# Patient Record
Sex: MALE | URBAN - METROPOLITAN AREA
[De-identification: names, ages, dates, MRNs, and addresses within clinical notes are randomized per-mention and may not be internally consistent; named-entity substitution may affect disease eponyms.]

---

## 2018-03-20 NOTE — PATIENT DISCUSSION
if pt doesn't achieve good vision with contact in left eye ; than may need to consider cataract surgery.

## 2019-02-19 NOTE — PATIENT DISCUSSION
stable. use pred once a day until pt runs out then switch to [address] [place] [address] Nicholas LONDONO.

## 2019-03-07 NOTE — PATIENT DISCUSSION
stable. use pred once a day until pt runs out then switch to [address] [place] [address] Nicholas LONDONO.

## 2019-03-07 NOTE — PATIENT DISCUSSION
stable. use pred once a day until pt runs out then switch to [address] [place] [address] Nicholas LONDONO.

## 2019-03-08 NOTE — PATIENT DISCUSSION
stable. use pred once a day until pt runs out then switch to [address] [place] [address] Nicholas LONDONO.

## 2019-03-12 NOTE — PATIENT DISCUSSION
pt ed needs more time to heal to assess results OD.  Pt says is NOT what he expected and is asking about LVC.  I ed much too early as the eye is not considered stable at this point.

## 2019-03-12 NOTE — PATIENT DISCUSSION
stable. use pred once a day until pt runs out then switch to [address] [place] [address] Nicholas LONDONO.

## 2019-03-18 NOTE — PATIENT DISCUSSION
vision may improve, but may be able to do [facility] at a later time. also disc CL therapy to get best potential vision.

## 2019-03-18 NOTE — PATIENT DISCUSSION
stable. use pred once a day until pt runs out then switch to [address] [place] [address] Nicholas LONDONO.

## 2019-03-18 NOTE — PATIENT DISCUSSION
Chief Complaints and History of Present Illnesses   Patient presents with     Eye Pain Left Eye     HPI    Affected eye(s):  Left   Symptoms:        Duration:  8 months   Frequency:  Intermittent       Do you have eye pain now?:  No      Comments:  Has intermittent pain in his left eye that has not resolved.  He is wondering if he can be referred to the [facility].  Sometimes vision in the RE gets a little blurred when he has eye pain in the left eye.  Not using any eye drops  Ana Hyatt, ARCENIO 1:25 PM 08/29/2017                    Plan OS 2nd; CV/B+ Goal MISTY OU. + TOPICLA/+Diamox+ENDOCOAT.

## 2019-04-25 NOTE — PATIENT DISCUSSION
"Requested Prescriptions   Pending Prescriptions Disp Refills     sertraline (ZOLOFT) 50 MG tablet [Pharmacy Med Name: SERTRALINE 50MG TABLETS] 135 tablet 0    Last Written Prescription Date:  07/05/18  Last Fill Quantity: 135,  # refills: 0   Last office visit: 9/5/2018 with prescribing provider:  09/05/18   Future Office Visit:     Sig: TAKE 1 AND 1/2 TABLETS(75 MG) BY MOUTH DAILY    SSRIs Protocol Passed    11/14/2018  8:51 AM       Passed - Recent (12 mo) or future (30 days) visit within the authorizing provider's specialty    Patient had office visit in the last 12 months or has a visit in the next 30 days with authorizing provider or within the authorizing provider's specialty.  See \"Patient Info\" tab in inbasket, or \"Choose Columns\" in Meds & Orders section of the refill encounter.             Passed - Patient is age 18 or older       Passed - No active pregnancy on record       Passed - No positive pregnancy test in last 12 months          " stable. use pred once a day until pt runs out then switch to [address] [place] [address] Nicholas LONDONO.

## 2019-04-25 NOTE — PATIENT DISCUSSION
stable. use pred once a day until pt runs out then switch to [address] [place] [address] Nicholas LONDONO.

## 2019-04-25 NOTE — PATIENT DISCUSSION
vision may improve, but may be able to do [facility] at a later time. also disc CL therapy to get best potential vision.

## 2019-04-25 NOTE — PATIENT DISCUSSION
vision may improve, but may be able to do [facility] at a later time. also disc CL therapy to get best potential vision.

## 2019-04-26 NOTE — PATIENT DISCUSSION
vision may improve, but may be able to do [facility] at a later time. also disc CL therapy to get best potential vision.

## 2019-04-26 NOTE — PATIENT DISCUSSION
stable. use pred once a day until pt runs out then switch to [address] [place] [address] Nicholas LONDONO.

## 2019-05-17 NOTE — PATIENT DISCUSSION
vision may improve, but may be able to do [facility] at a later time. also disc CL therapy to get best potential vision.

## 2019-05-17 NOTE — PATIENT DISCUSSION
Monitor.  has been out of PGB for a week.  ed re-start with PA OS and use TID for 5 days, BID for 5 days, QD for 5 days to thwart inflammation.  once done start with FML once/day ongoing.

## 2019-05-17 NOTE — PATIENT DISCUSSION
5/17/19 Good postoperative appearance.  re-start FML OD now and use QD ongoing to prevent rejection.  Pt asking about LVC enh OD today ed I will send info to [facility] team to inquire about this but currently has BCVA OD of 20/60 with MR today so this will be up to [facility].

## 2019-05-17 NOTE — PATIENT DISCUSSION
stable. use pred once a day until pt runs out then switch to [address] [place] [address] Nicholas LONDONO.

## 2019-05-23 NOTE — PROCEDURE NOTE: SURGICAL
<p>Prior to commencing surgery patient identification, surgical procedure, site, and side were confirmed by Dr. Brennan Junior. Following topical proparacaine anesthesia, the patient was positioned at the YAG laser, a contact lens coupled to the cornea with methylcellulose and an axial posterior capsulotomy performed without complication using 2.9 Mj x 26. Excess methylcellulose was washed from the eye, one drop of Alphagan was instilled and the patient returned to the holding area having tolerated the procedure well and without complication. </p><p>MRN:[id number]</p>

## 2019-07-10 NOTE — PATIENT DISCUSSION
Healing well.  pt ed may take 3-6 months to achieve full results of LVC.  pt full ed.  pt says he expected he would get 20/40 vision and it is worse than before we started.

## 2019-08-28 NOTE — PATIENT DISCUSSION
REC EPI-LASEK enhancement w/ [address] Goal MISTY OD - patient must wait 6 weeks , REPEAT AR/MR RAMYA DOS. - advised patient he may be CXL'd DOS due to shift in MR.

## 2019-10-15 NOTE — PATIENT DISCUSSION
10/15/19 MDI not loading properly. Check ocular meds @ next visit to remove any possible duplicates.

## 2019-10-15 NOTE — PROCEDURE NOTE: CLINICAL
PROCEDURE NOTE: Punctal Plugs, Silicone #1 OS. Diagnosis: Dry Eye Syndrome. Prior to treatment, the risks/benefits/alternatives were discussed. The patient wished to proceed with procedure. Permanent silicone plugs were inserted. Size/location of plugs inserted: LLL 0.7MM. Patient tolerated procedure well. There were no complications. Post procedure instructions given. Marilee Mcneal

## 2019-10-17 NOTE — PATIENT DISCUSSION
REC EPI-LASEK enhancement w/ [facility] Goal MISTY OD - patient must wait 6 weeks , REPEAT AR/MR RAMYA DOS. - advised patient he may be CXL'd DOS due to shift in MR.

## 2019-10-17 NOTE — PATIENT DISCUSSION
Patient informed and instructed to give more time to heal.  Patient informed we do not expect instant outcomes with [address] [place] [address].

## 2019-10-18 NOTE — PATIENT DISCUSSION
REC EPI-LASEK enhancement w/ [address] Goal MISTY OD - patient must wait 6 weeks , REPEAT AR/MR RAMAY DOS. - advised patient he may be CXL'd DOS due to shift in MR.

## 2019-10-18 NOTE — PATIENT DISCUSSION
Patient informed and instructed to give more time to heal.  Patient informed we do not expect instant outcomes with Abby Kimball.

## 2019-10-24 NOTE — PATIENT DISCUSSION
REC EPI-LASEK enhancement w/ [facility] Goal MISTY OD - patient must wait 6 weeks , REPEAT AR/MR RAMYA DOS. - advised patient he may be CXL'd DOS due to shift in MR.

## 2019-10-24 NOTE — PATIENT DISCUSSION
Patient informed and instructed to give more time to heal.  Patient informed we do not expect instant outcomes with Janey Malone.

## 2019-11-26 NOTE — PATIENT DISCUSSION
Patient informed that he had multiple corneal surgeries and needs to be patient to let cornea heal more.

## 2019-11-26 NOTE — PATIENT DISCUSSION
Patient informed and instructed to give more time to heal.  Patient informed we do not expect instant outcomes with Piper Jimenez.

## 2019-12-03 NOTE — PATIENT DISCUSSION
Pt is not a candidate for Lasek in left eye. Will need contact lens to see achieve best corrected vision.

## 2020-03-17 NOTE — PATIENT DISCUSSION
Still healing and pt has irregular astigmatism and will need a contact lens. Will need to wait 6 months from last Lasek Treatment to have contacts. Will achieve best corrected vision with contacts.

## 2020-05-14 ENCOUNTER — IMPORTED ENCOUNTER (OUTPATIENT)
Dept: URBAN - METROPOLITAN AREA CLINIC 9 | Facility: CLINIC | Age: 35
End: 2020-05-14

## 2020-05-15 NOTE — PATIENT DISCUSSION
05/15/2020 Trial of scleral lenses did not improve VA enough to warrant continuing, and he had success with Soft CL's before. Therefore, I fit him with a soft toric for OS today and he did feel it helped the OU [place]. WIll order trials in Daily disposables. He will wear the [employer] for now. Pt aware of no water use on lens and only DW use. Knows I/R.

## 2020-05-19 ENCOUNTER — IMPORTED ENCOUNTER (OUTPATIENT)
Dept: URBAN - METROPOLITAN AREA CLINIC 9 | Facility: CLINIC | Age: 35
End: 2020-05-19

## 2020-12-16 NOTE — PATIENT DISCUSSION
05/15/2020 Trial of scleral lenses did not improve VA enough to warrant continuing, and he had success with Soft CL's before. Therefore, I fit him with a soft toric for OS today and he did feel it helped the OU [place]. WIll order trials in Daily disposables. He will wear the [employer] for now. Pt aware of no water use on lens and only DW use. Knows I/R.

## 2021-03-16 NOTE — PATIENT DISCUSSION
05/15/2020 Trial of scleral lenses did not improve VA enough to warrant continuing, and he had success with Soft CL's before. Therefore, I fit him with a soft toric for OS today and he did feel it helped the OU [place]. WIll order trials in Daily disposables. He will wear the [employer] for now. Pt aware of no water use on lens and only DW use. Knows I/R.

## 2021-10-16 ASSESSMENT — TONOMETRY
OD_IOP_MMHG: 16
OD_IOP_MMHG: 13
OS_IOP_MMHG: 16
OS_IOP_MMHG: 15

## 2021-10-16 ASSESSMENT — VISUAL ACUITY
OS_CC: 20/30 - SN
OD_CC: 20/30 - SN
OS_CC: 20/30 - SN
OD_CC: 20/60 SN

## 2021-11-19 NOTE — PATIENT DISCUSSION
11/19/2021 Trial of scleral lens today did give improvement to [place]. However, a glasses SRX may also give pt just as good if not more tolerable VA. Pt to get glasses and consider if he wants the CL's. Order a trial scleral based on findings PRN if pt wants to move forward.

## 2021-11-19 NOTE — PATIENT DISCUSSION
12/09/2021 Pt demanded a soft CL RX from last years visit. OK My Day toric for OS that was fit. No lens was fit for OD. Pt was very rude to the staff regarding the CL RX.

## 2021-12-28 NOTE — PATIENT DISCUSSION
12/28/2021 Pt came in demanding to be fit for soft CL's. Will order trials and have him back for a soft toric fit.

## 2022-01-27 NOTE — PATIENT DISCUSSION
Start Vigamox 4 times day OD. Telephone Encounter by Mariah Barahona RN at 02/06/18 12:04 PM     Author:  Mariah Barahona RN Service:  (none) Author Type:  Registered Nurse     Filed:  02/06/18 12:04 PM Encounter Date:  2/5/2018 Status:  Signed     :  Mariah Barahona RN (Registered Nurse)            Medications refilled per Cardiology protocol[YP1.1T]        Revision History        User Key Date/Time User Provider Type Action    > YP1.1 02/06/18 12:04 PM Mariah Barahona RN Registered Nurse Sign    T - Template

## 2022-01-27 NOTE — PATIENT DISCUSSION
01/27/2022 Pt to compare the CL trials above and then decide which power he likes. Will order as the My Day TOric in final power.

## 2022-02-09 NOTE — PATIENT DISCUSSION
Detail Level: Detailed stable. [address] [facility] Nicholas LONDONO. Quality 226: Preventive Care And Screening: Tobacco Use: Screening And Cessation Intervention: Patient screened for tobacco use and is an ex/non-smoker Quality 130: Documentation Of Current Medications In The Medical Record: Current Medications Documented Quality 431: Preventive Care And Screening: Unhealthy Alcohol Use - Screening: Patient not identified as an unhealthy alcohol user when screened for unhealthy alcohol use using a systematic screening method

## 2022-03-11 NOTE — PATIENT DISCUSSION
10/15/19 MDI not loading properly. Check ocular meds @ next visit to remove any possible duplicates. COUGH

## 2022-03-11 NOTE — PATIENT DISCUSSION
Monitor. Peng Advancement Flap Text: The defect edges were debeveled with a #15 scalpel blade.  Given the location of the defect, shape of the defect and the proximity to free margins a Peng advancement flap was deemed most appropriate.  Using a sterile surgical marker, an appropriate advancement flap was drawn incorporating the defect and placing the expected incisions within the relaxed skin tension lines where possible. The area thus outlined was incised deep to adipose tissue with a #15 scalpel blade.  The skin margins were undermined to an appropriate distance in all directions utilizing iris scissors.

## 2022-07-05 RX ORDER — INSULIN DETEMIR 100 [IU]/ML
INJECTION, SOLUTION SUBCUTANEOUS
COMMUNITY

## 2022-07-05 RX ORDER — ALBUTEROL SULFATE 90 UG/1
AEROSOL, METERED RESPIRATORY (INHALATION)
COMMUNITY
Start: 2022-01-05

## 2022-07-05 RX ORDER — AMLODIPINE BESYLATE AND BENAZEPRIL HYDROCHLORIDE 10; 40 MG/1; MG/1
1 CAPSULE ORAL
COMMUNITY

## 2022-07-05 RX ORDER — ROSUVASTATIN CALCIUM 40 MG/1
TABLET, COATED ORAL
COMMUNITY

## 2022-07-05 RX ORDER — INSULIN LISPRO 100 [IU]/ML
INJECTION, SOLUTION INTRAVENOUS; SUBCUTANEOUS
COMMUNITY
End: 2022-10-03 | Stop reason: SDUPTHER

## 2022-07-05 RX ORDER — FLUTICASONE PROPIONATE 50 MCG
SPRAY, SUSPENSION (ML) NASAL
COMMUNITY

## 2022-09-08 PROBLEM — E78.5 DYSLIPIDEMIA: Status: ACTIVE | Noted: 2022-09-08

## 2022-09-08 PROBLEM — I10 ESSENTIAL HYPERTENSION: Status: ACTIVE | Noted: 2022-09-08

## 2022-09-08 PROBLEM — E10.9 TYPE 1 DIABETES MELLITUS WITHOUT COMPLICATION (HCC): Status: ACTIVE | Noted: 2022-09-08

## 2024-05-31 NOTE — PATIENT DISCUSSION
Still healing and pt has irregular astigmatism and will need a contact lens. Will need to wait 6 months from last Lasek Treatment to have contacts. Will achieve best corrected vision with contacts. none
